# Patient Record
Sex: FEMALE | Race: WHITE | Employment: STUDENT | URBAN - METROPOLITAN AREA
[De-identification: names, ages, dates, MRNs, and addresses within clinical notes are randomized per-mention and may not be internally consistent; named-entity substitution may affect disease eponyms.]

---
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
## 2022-04-19 DIAGNOSIS — F41.9 ANXIETY: ICD-10-CM

## 2022-04-19 RX ORDER — FLUOXETINE 10 MG/1
CAPSULE ORAL
Qty: 90 CAPSULE | Refills: 3 | Status: SHIPPED | OUTPATIENT
Start: 2022-04-19 | End: 2022-08-17

## 2022-04-19 NOTE — TELEPHONE ENCOUNTER
"Requested Prescriptions   Signed Prescriptions Disp Refills    FLUoxetine (PROZAC) 10 MG capsule 90 capsule 3     Sig: TAKE 1 CAPSULE(10 MG) BY MOUTH DAILY       SSRIs Protocol Passed - 4/19/2022  8:02 AM        Passed - Recent (12 mo) or future (30 days) visit within the authorizing provider's specialty     Patient has had an office visit with the authorizing provider or a provider within the authorizing providers department within the previous 12 mos or has a future within next 30 days. See \"Patient Info\" tab in inbasket, or \"Choose Columns\" in Meds & Orders section of the refill encounter.              Passed - Medication is active on med list        Passed - Patient is age 18 or older        Passed - No active pregnancy on record        Passed - No positive pregnancy test in last 12 months           Sandrine Paez RN  [facility]     "

## 2022-07-10 ENCOUNTER — HEALTH MAINTENANCE LETTER (OUTPATIENT)
Age: 22
End: 2022-07-10

## 2022-08-17 ENCOUNTER — LAB (OUTPATIENT)
Dept: LAB | Facility: CLINIC | Age: 22
End: 2022-08-17
Payer: COMMERCIAL

## 2022-08-17 ENCOUNTER — OFFICE VISIT (OUTPATIENT)
Dept: FAMILY MEDICINE | Facility: CLINIC | Age: 22
End: 2022-08-17

## 2022-08-17 VITALS
BODY MASS INDEX: 44.39 KG/M2 | WEIGHT: 260 LBS | OXYGEN SATURATION: 98 % | HEART RATE: 70 BPM | SYSTOLIC BLOOD PRESSURE: 146 MMHG | DIASTOLIC BLOOD PRESSURE: 86 MMHG | HEIGHT: 64 IN | TEMPERATURE: 97.9 F

## 2022-08-17 DIAGNOSIS — Z00.01 ENCOUNTER FOR PREVENTATIVE ADULT HEALTH CARE EXAM WITH ABNORMAL FINDINGS: Primary | ICD-10-CM

## 2022-08-17 DIAGNOSIS — Z23 NEED FOR VACCINATION: ICD-10-CM

## 2022-08-17 DIAGNOSIS — R03.0 ELEVATED BLOOD PRESSURE READING WITHOUT DIAGNOSIS OF HYPERTENSION: ICD-10-CM

## 2022-08-17 DIAGNOSIS — Z11.3 SCREENING FOR STDS (SEXUALLY TRANSMITTED DISEASES): ICD-10-CM

## 2022-08-17 DIAGNOSIS — Z11.59 NEED FOR HEPATITIS C SCREENING TEST: ICD-10-CM

## 2022-08-17 DIAGNOSIS — Z11.4 SCREENING FOR HIV (HUMAN IMMUNODEFICIENCY VIRUS): ICD-10-CM

## 2022-08-17 DIAGNOSIS — F41.9 ANXIETY: ICD-10-CM

## 2022-08-17 DIAGNOSIS — Z00.01 ENCOUNTER FOR PREVENTATIVE ADULT HEALTH CARE EXAM WITH ABNORMAL FINDINGS: ICD-10-CM

## 2022-08-17 DIAGNOSIS — N94.6 DYSMENORRHEA: ICD-10-CM

## 2022-08-17 PROBLEM — G56.03 BILATERAL CARPAL TUNNEL SYNDROME: Status: RESOLVED | Noted: 2021-06-18 | Resolved: 2022-08-17

## 2022-08-17 PROBLEM — K80.20 GALLSTONES: Status: RESOLVED | Noted: 2019-08-21 | Resolved: 2022-08-17

## 2022-08-17 LAB
ALBUMIN SERPL-MCNC: 3.6 G/DL (ref 3.4–5)
ALP SERPL-CCNC: 68 U/L (ref 40–150)
ALT SERPL W P-5'-P-CCNC: 67 U/L (ref 0–50)
ANION GAP SERPL CALCULATED.3IONS-SCNC: 7 MMOL/L (ref 3–14)
AST SERPL W P-5'-P-CCNC: 202 U/L (ref 0–45)
BILIRUB SERPL-MCNC: 0.3 MG/DL (ref 0.2–1.3)
BUN SERPL-MCNC: 14 MG/DL (ref 7–30)
CALCIUM SERPL-MCNC: 9.2 MG/DL (ref 8.5–10.1)
CHLORIDE BLD-SCNC: 109 MMOL/L (ref 94–109)
CO2 SERPL-SCNC: 22 MMOL/L (ref 20–32)
CREAT SERPL-MCNC: 0.67 MG/DL (ref 0.52–1.04)
ERYTHROCYTE [DISTWIDTH] IN BLOOD BY AUTOMATED COUNT: 12.7 % (ref 10–15)
GFR SERPL CREATININE-BSD FRML MDRD: >90 ML/MIN/1.73M2
GLUCOSE BLD-MCNC: 86 MG/DL (ref 70–99)
HCT VFR BLD AUTO: 39.9 % (ref 35–47)
HGB BLD-MCNC: 13.3 G/DL (ref 11.7–15.7)
MCH RBC QN AUTO: 29 PG (ref 26.5–33)
MCHC RBC AUTO-ENTMCNC: 33.3 G/DL (ref 31.5–36.5)
MCV RBC AUTO: 87 FL (ref 78–100)
PLATELET # BLD AUTO: 293 10E3/UL (ref 150–450)
POTASSIUM BLD-SCNC: 4.3 MMOL/L (ref 3.4–5.3)
PROT SERPL-MCNC: 7.3 G/DL (ref 6.8–8.8)
RBC # BLD AUTO: 4.59 10E6/UL (ref 3.8–5.2)
SODIUM SERPL-SCNC: 138 MMOL/L (ref 133–144)
TSH SERPL DL<=0.005 MIU/L-ACNC: 3.08 MU/L (ref 0.4–4)
WBC # BLD AUTO: 8.3 10E3/UL (ref 4–11)

## 2022-08-17 PROCEDURE — 99213 OFFICE O/P EST LOW 20 MIN: CPT | Mod: 25 | Performed by: FAMILY MEDICINE

## 2022-08-17 PROCEDURE — 80053 COMPREHEN METABOLIC PANEL: CPT

## 2022-08-17 PROCEDURE — 87591 N.GONORRHOEAE DNA AMP PROB: CPT

## 2022-08-17 PROCEDURE — 90472 IMMUNIZATION ADMIN EACH ADD: CPT | Performed by: FAMILY MEDICINE

## 2022-08-17 PROCEDURE — 85027 COMPLETE CBC AUTOMATED: CPT

## 2022-08-17 PROCEDURE — 87491 CHLMYD TRACH DNA AMP PROBE: CPT

## 2022-08-17 PROCEDURE — 86803 HEPATITIS C AB TEST: CPT

## 2022-08-17 PROCEDURE — 87389 HIV-1 AG W/HIV-1&-2 AB AG IA: CPT

## 2022-08-17 PROCEDURE — 90651 9VHPV VACCINE 2/3 DOSE IM: CPT | Performed by: FAMILY MEDICINE

## 2022-08-17 PROCEDURE — 90471 IMMUNIZATION ADMIN: CPT | Performed by: FAMILY MEDICINE

## 2022-08-17 PROCEDURE — 84443 ASSAY THYROID STIM HORMONE: CPT

## 2022-08-17 PROCEDURE — 99395 PREV VISIT EST AGE 18-39: CPT | Mod: 25 | Performed by: FAMILY MEDICINE

## 2022-08-17 PROCEDURE — 90620 MENB-4C VACCINE IM: CPT | Performed by: FAMILY MEDICINE

## 2022-08-17 PROCEDURE — 96127 BRIEF EMOTIONAL/BEHAV ASSMT: CPT | Performed by: FAMILY MEDICINE

## 2022-08-17 PROCEDURE — 36415 COLL VENOUS BLD VENIPUNCTURE: CPT

## 2022-08-17 RX ORDER — FLUOXETINE 10 MG/1
CAPSULE ORAL
Qty: 90 CAPSULE | Refills: 3 | Status: SHIPPED | OUTPATIENT
Start: 2022-08-17 | End: 2022-09-07 | Stop reason: DRUGHIGH

## 2022-08-17 RX ORDER — CARBOXYMETHYLCELLULOSE/CITRIC 0.75 G
1 CAPSULE ORAL DAILY
Qty: 30 CAPSULE | Refills: 11 | Status: SHIPPED | OUTPATIENT
Start: 2022-08-17 | End: 2022-09-07

## 2022-08-17 ASSESSMENT — ENCOUNTER SYMPTOMS
MYALGIAS: 0
EYE PAIN: 0
SORE THROAT: 0
WEAKNESS: 0
FEVER: 0
ARTHRALGIAS: 0
DIARRHEA: 0
NERVOUS/ANXIOUS: 0
COUGH: 0
DIZZINESS: 0
JOINT SWELLING: 0
HEARTBURN: 0
HEMATOCHEZIA: 0
PARESTHESIAS: 0
CONSTIPATION: 0
PALPITATIONS: 0
NAUSEA: 0
BREAST MASS: 0
SHORTNESS OF BREATH: 0
HEMATURIA: 0
FREQUENCY: 0
ABDOMINAL PAIN: 0
CHILLS: 0
DYSURIA: 0
HEADACHES: 0

## 2022-08-17 ASSESSMENT — PAIN SCALES - GENERAL: PAINLEVEL: NO PAIN (0)

## 2022-08-17 ASSESSMENT — PATIENT HEALTH QUESTIONNAIRE - PHQ9
SUM OF ALL RESPONSES TO PHQ QUESTIONS 1-9: 3
SUM OF ALL RESPONSES TO PHQ QUESTIONS 1-9: 3
10. IF YOU CHECKED OFF ANY PROBLEMS, HOW DIFFICULT HAVE THESE PROBLEMS MADE IT FOR YOU TO DO YOUR WORK, TAKE CARE OF THINGS AT HOME, OR GET ALONG WITH OTHER PEOPLE: NOT DIFFICULT AT ALL

## 2022-08-17 NOTE — PATIENT INSTRUCTIONS
Contact in 6 weeks with report on Plenity    Will change if needed    Continue weights and treadmill 3-4 times weekly    Followup 3rd HPV shot, ~Dec '22    Call [phone] to schedule gynecology appointment    Use [employer] to send in twice daily blood pressure and pulse readings x3 days using your mother's blood pressure cuff.  Goal is less than 140/90

## 2022-08-17 NOTE — PROGRESS NOTES
SUBJECTIVE:   CC: Alisia Larson is an 22 year old woman who presents for preventive health visit.       Patient has been advised of split billing requirements and indicates understanding: Yes  Healthy Habits:     Getting at least 3 servings of Calcium per day:  Yes    Bi-annual eye exam:  NO    Dental care twice a year:  Yes    Sleep apnea or symptoms of sleep apnea:  None    Diet:  Regular (no restrictions)    Frequency of exercise:  2-3 days/week    Duration of exercise:  45-60 minutes    Taking medications regularly:  Yes    Medication side effects:  None    PHQ-2 Total Score: 0    Additional concerns today:  No  Answers for HPI/ROS submitted by the patient on 8/17/2022  If you checked off any problems, how difficult have these problems made it for you to do your work, take care of things at home, or get along with other people?: Not difficult at all  PHQ9 TOTAL SCORE: 3    Noted hen she has been getting her periods lately they have been hurting really bas, wondering if she should make an appointment with a gynescologist for that.     Concern -worsening menstrual cramps  Onset: Several months    Description:   Bad cramps have become worse    Intensity: moderate, severe    Progression of Symptoms:  worsening and intermittent    Accompanying Signs & Symptoms:  No increase in bleeding    Previous history of similar problem:   Cramps involved in bed    Precipitating factors:   Worsened by: None    Alleviating factors:  Improved by: None  Therapies Tried and outcome: Ibuprofen only partially helpful    Anxiety Follow-Up    How are you doing with your anxiety since your last visit? No change    Are you having other symptoms that might be associated with anxiety? No    Have you had a significant life event? No     Are you feeling depressed? No    Do you have any concerns with your use of alcohol or other drugs? No    Social History     Tobacco Use     Smoking status: Never Smoker     Smokeless tobacco: Never  Used   Vaping Use     Vaping Use: Never used   Substance Use Topics     Alcohol use: Yes     Drug use: No     No flowsheet data found.  PHQ 8/17/2022   PHQ-9 Total Score 3   Q9: Thoughts of better off dead/self-harm past 2 weeks Not at all       Today's PHQ-2 Score:   PHQ-2 ( 1999 [employer]) 8/17/2022   Q1: Little interest or pleasure in doing things 0   Q2: Feeling down, depressed or hopeless 0   PHQ-2 Score 0   Q1: Little interest or pleasure in doing things More than half the days   Q2: Feeling down, depressed or hopeless Several days   PHQ-2 Score 3       Abuse: Current or Past (Physical, Sexual or Emotional) - No  Do you feel safe in your environment? Yes        Social History     Tobacco Use     Smoking status: Never Smoker     Smokeless tobacco: Never Used   Substance Use Topics     Alcohol use: Yes     If you drink alcohol do you typically have >3 drinks per day or >7 drinks per week? No    Alcohol Use 8/17/2022   Prescreen: >3 drinks/day or >7 drinks/week? No   Prescreen: >3 drinks/day or >7 drinks/week? -   No flowsheet data found.    Reviewed orders with patient.  Reviewed health maintenance and updated orders accordingly - Yes  Lab work is in process  Labs reviewed in EPIC    Breast Cancer Screening:        History of abnormal Pap smear: NO - under age 21, PAP not appropriate for age     Reviewed and updated as needed this visit by clinical staff   Tobacco  Allergies   Problems  Med Hx  Surg Hx  Fam Hx  Soc Hx          Reviewed and updated as needed this visit by Provider      Problems                Review of Systems   Constitutional: Negative for chills and fever.   HENT: Negative for congestion, ear pain, hearing loss and sore throat.    Eyes: Negative for pain and visual disturbance.   Respiratory: Negative for cough and shortness of breath.    Cardiovascular: Negative for chest pain, palpitations and peripheral edema.   Gastrointestinal: Negative for abdominal pain, constipation, diarrhea,  "heartburn, hematochezia and nausea.   Breasts:  Negative for tenderness, breast mass and discharge.   Genitourinary: Negative for dysuria, frequency, genital sores, hematuria, pelvic pain, urgency, vaginal bleeding and vaginal discharge.   Musculoskeletal: Negative for arthralgias, joint swelling and myalgias.   Skin: Negative for rash.   Neurological: Negative for dizziness, weakness, headaches and paresthesias.   Psychiatric/Behavioral: Negative for mood changes. The patient is not nervous/anxious.       OBJECTIVE:   BP (!) 146/86 (BP Location: Left arm, Patient Position: Sitting, Cuff Size: Adult Regular)   Pulse 70   Temp 97.9  F (36.6  C) (Temporal)   Ht 1.626 m (5' 4\")   Wt 117.9 kg (260 lb)   SpO2 98%   Breastfeeding No   BMI 44.63 kg/m    Physical Exam  GENERAL: healthy, alert, no distress and over weight  EYES: Eyes grossly normal to inspection, PERRL and conjunctivae and sclerae normal  HENT: ear canals and TM's normal, nose and mouth without ulcers or lesions  NECK: no adenopathy, no asymmetry, masses, or scars and thyroid normal to palpation  RESP: lungs clear to auscultation - no rales, rhonchi or wheezes  CV: regular rate and rhythm, normal S1 S2, no S3 or S4, no murmur, click or rub, no peripheral edema and peripheral pulses strong  ABDOMEN: soft, nontender, no hepatosplenomegaly, no masses and bowel sounds normal  MS: no gross musculoskeletal defects noted, no edema  SKIN: no suspicious lesions or rashes  NEURO: Normal strength and tone, mentation intact and speech normal  PSYCH: mentation appears normal, affect normal/bright    Diagnostic Test Results:  Labs reviewed in Epic    ASSESSMENT/PLAN:       ICD-10-CM    1. Encounter for preventative adult health care exam with abnormal findings  Z00.01 Comprehensive metabolic panel (BMP + Alb, Alk Phos, ALT, AST, Total. Bili, TP)   2. Dysmenorrhea  N94.6 Ob/Gyn Referral   3. Anxiety  F41.9 CBC with platelets     FLUoxetine (PROZAC) 10 MG capsule " "  4. Elevated blood pressure reading without diagnosis of hypertension  R03.0    5. Need for vaccination  Z23 Human Papilloma Virus Vaccine (Gardasil 9) 3 Dose IM     MEN B, IM (10 - 25 YRS) - Bexsero   6. BMI 40.0-44.9, adult (H)  Z68.41 Carboxymeth-Cellulose-CitricAc (PLENITY) CAPS     TSH with free T4 reflex     COUNSELING:  Reviewed preventive health counseling, as reflected in patient instructions       Regular exercise       Healthy diet/nutrition       Vision screening       Safe sex practices/STD prevention    Estimated body mass index is 44.63 kg/m  as calculated from the following:    Height as of this encounter: 1.626 m (5' 4\").    Weight as of this encounter: 117.9 kg (260 lb).    She reports that she has never smoked. She has never used smokeless tobacco.    Patient Instructions   Contact in 6 weeks with report on Plenity    Will change if needed    Continue weights and treadmill 3-4 times weekly    Followup 3rd HPV shot, ~Dec '22    Call [phone] to schedule gynecology appointment    Use [employer] to send in twice daily blood pressure and pulse readings x3 days using your mother's blood pressure cuff.  Goal is less than 140/90    Gage Villegas MD  [facility]  "

## 2022-08-18 PROBLEM — R03.0 ELEVATED BLOOD PRESSURE READING WITHOUT DIAGNOSIS OF HYPERTENSION: Status: ACTIVE | Noted: 2022-08-18

## 2022-08-18 LAB
C TRACH DNA SPEC QL NAA+PROBE: NEGATIVE
HCV AB SERPL QL IA: NONREACTIVE
HIV 1+2 AB+HIV1 P24 AG SERPL QL IA: NONREACTIVE
N GONORRHOEA DNA SPEC QL NAA+PROBE: NEGATIVE

## 2022-08-29 DIAGNOSIS — R74.01 TRANSAMINITIS: Primary | ICD-10-CM

## 2022-08-29 NOTE — RESULT ENCOUNTER NOTE
Kenny Crump: Your recent results are normal,      except 2 tests indicate inflammation of the liver. As this may be the result of excess of simple carbs (bread rice pasta and potatoes, as well as sweets), please reduce your intake ~by about 50%, instead eating vegetables and fruit. Also no alcohol.      Schedule fasting lab only appointment in ~ 6 weeks to repeat these fasting labs.    Please contact if questions.      Gage Villegas MD

## 2022-09-04 ENCOUNTER — HEALTH MAINTENANCE LETTER (OUTPATIENT)
Age: 22
End: 2022-09-04

## 2022-09-07 ENCOUNTER — VIRTUAL VISIT (OUTPATIENT)
Dept: FAMILY MEDICINE | Facility: CLINIC | Age: 22
End: 2022-09-07
Payer: COMMERCIAL

## 2022-09-07 DIAGNOSIS — F42.9 OBSESSIVE-COMPULSIVE DISORDER, UNSPECIFIED TYPE: Primary | ICD-10-CM

## 2022-09-07 DIAGNOSIS — F41.9 ANXIETY: ICD-10-CM

## 2022-09-07 DIAGNOSIS — K76.0 FATTY LIVER: ICD-10-CM

## 2022-09-07 PROCEDURE — 99213 OFFICE O/P EST LOW 20 MIN: CPT | Mod: TEL | Performed by: FAMILY MEDICINE

## 2022-09-07 RX ORDER — CARBOXYMETHYLCELLULOSE/CITRIC 0.75 G
1 CAPSULE ORAL DAILY
Qty: 30 CAPSULE | Refills: 11 | Status: SHIPPED | OUTPATIENT
Start: 2022-09-07

## 2022-09-07 RX ORDER — HYDROXYZINE HYDROCHLORIDE 25 MG/1
25 TABLET, FILM COATED ORAL
Qty: 45 TABLET | Refills: 3 | Status: SHIPPED | OUTPATIENT
Start: 2022-09-07 | End: 2023-10-17

## 2022-09-07 NOTE — PROGRESS NOTES
"Alisia is a 22 year old who is being evaluated via a billable telephone visit.      What phone number would you like to be contacted at? [phone]  How would you like to obtain your AVS? MyChart    Assessment & Plan     Obsessive-compulsive disorder, unspecified type  At goal     Anxiety  At goal   - hydrOXYzine (ATARAX) 25 MG tablet; Take 1 tablet (25 mg) by mouth nightly as needed for anxiety  - FLUoxetine (PROZAC) 20 MG capsule; Take 1 capsule (20 mg) by mouth daily    Fatty liver  Reducing simple carbs    BMI 40.0-44.9, adult (H)  - Carboxymeth-Cellulose-CitricAc (PLENITY) CAPS; Take 1 capsule by mouth daily    Review of external notes as documented elsewhere in note  Ordering of each unique test  Prescription drug management  25 minutes spent on the date of the encounter doing chart review, history and exam, documentation and further activities per the note     BMI:   Estimated body mass index is 44.63 kg/m  as calculated from the following:    Height as of 8/17/22: 1.626 m (5' 4\").    Weight as of 8/17/22: 117.9 kg (260 lb).     Patient Instructions   Contact pharmacy about Plenity    Followup in 6 months with me for medication update.    Within 12 months, repeat AST/ALT      Return in about 6 months (around 3/7/2023) for Routine Visit.    Gage Villegas MD  [facility]    Subjective   Alisia is a 22 year old, presenting for the following health issues:  Recheck Medication      HPI     Would like to discuss increasing her medication, also would like to recheck her ASL level as her last result came back high and she is concerned about that.    Anxiety Follow-Up    How are you doing with your anxiety since your last visit? Improved    Are you having other symptoms that might be associated with anxiety? Yes:  Yes, OCD    Have you had a significant life event? OTHER: Out of town College at  [employer]     Are you feeling depressed? No    Do you have any concerns with your use of " alcohol or other drugs? No    Social History     Tobacco Use     Smoking status: Never Smoker     Smokeless tobacco: Never Used   Vaping Use     Vaping Use: Never used   Substance Use Topics     Alcohol use: Yes     Drug use: No     No flowsheet data found.  PHQ 8/17/2022   PHQ-9 Total Score 3   Q9: Thoughts of better off dead/self-harm past 2 weeks Not at all       How many days per week do you miss taking your medication? 0    Fatty liver  Onset/Duration: Several years  Description: Asymptomatic  Intensity: Not applicable  Progression of Symptoms: Working on healthy eating  Accompanying Signs & Symptoms:  Does it feel like food gets stuck or trouble swallowing: No  Nausea: No  Vomiting (bloody?): No  Abdominal Pain: No  Black-Tarry stools: No  Bloody stools: No  History:  Previous similar episodes: YES  Previous ulcers: No  Precipitating factors:   Caffeine use: Some  Alcohol use: No  NSAID/Aspirin use: No  Tobacco use: No  Worse with .Simple carbohydrates  Alleviating factors: Reducing them  Therapies tried and outcome:             Lifestyle changes: Yes, helping            Medications: Pharmacy has medication on order, has not tried yet Plenity.  Switched order to to pharmacy    Review of Systems   Constitutional, HEENT, cardiovascular, pulmonary, gi and gu systems are negative, except as otherwise noted.      Objective           Vitals:  No vitals were obtained today due to virtual visit.    Physical Exam   healthy, alert and no distress  PSYCH: Alert and oriented times 3; coherent speech, normal   rate and volume, able to articulate logical thoughts, able   to abstract reason, no tangential thoughts, no hallucinations   or delusions  Her affect is normal  RESP: No cough, no audible wheezing, able to talk in full sentences  Remainder of exam unable to be completed due to telephone visits        Phone call duration: 25 minutes

## 2022-09-07 NOTE — PATIENT INSTRUCTIONS
Contact pharmacy about Plenity    Followup in 6 months with me for medication update.    Within 12 months, repeat AST/ALT

## 2023-07-18 ENCOUNTER — PATIENT OUTREACH (OUTPATIENT)
Dept: CARE COORDINATION | Facility: CLINIC | Age: 23
End: 2023-07-18
Payer: COMMERCIAL

## 2023-08-01 ENCOUNTER — PATIENT OUTREACH (OUTPATIENT)
Dept: CARE COORDINATION | Facility: CLINIC | Age: 23
End: 2023-08-01
Payer: COMMERCIAL

## 2023-10-01 ENCOUNTER — HEALTH MAINTENANCE LETTER (OUTPATIENT)
Age: 23
End: 2023-10-01

## 2023-10-16 DIAGNOSIS — F41.9 ANXIETY: ICD-10-CM

## 2023-10-17 RX ORDER — HYDROXYZINE HYDROCHLORIDE 25 MG/1
TABLET, FILM COATED ORAL
Qty: 60 TABLET | Refills: 0 | Status: SHIPPED | OUTPATIENT
Start: 2023-10-17 | End: 2024-05-24

## 2024-03-25 DIAGNOSIS — F41.9 ANXIETY: ICD-10-CM

## 2024-03-25 NOTE — TELEPHONE ENCOUNTER
Pending Prescriptions:                       Disp   Refills    FLUoxetine (PROZAC) 20 MG capsule         30 cap*0            Sig: Take 1 capsule (20 mg) by mouth daily Overdue for           annual appointment please call [phone] to           schedule and get full refills

## 2024-04-09 ENCOUNTER — TELEPHONE (OUTPATIENT)
Dept: FAMILY MEDICINE | Facility: CLINIC | Age: 24
End: 2024-04-09
Payer: COMMERCIAL

## 2024-04-09 DIAGNOSIS — L72.0 WEN: Primary | ICD-10-CM

## 2024-04-09 NOTE — TELEPHONE ENCOUNTER
Reason for Call:  Other call back    Detailed comments: pt has what she believes is a sebaceous cyst on her head. She is wondering what Dermatologist Dr. Villegas would recommend. Please contact pt.    Phone Number Patient can be reached at: Home number on file [phone] (home)    Best Time: any    Can we leave a detailed message on this number? YES    Call taken on 4/9/2024 at 9:46 AM by Sarahi Velasco

## 2024-04-09 NOTE — PROGRESS NOTES
Recommend derm at [place]- who depends on availability. Order signed, please notify, thanks Gage

## 2024-04-29 DIAGNOSIS — F41.9 ANXIETY: ICD-10-CM

## 2024-05-01 NOTE — TELEPHONE ENCOUNTER
Prescription filled for 30 day supply. Please notify- patient needs to schedule appointment for medication check.  Order signed, please notify, thanks Gage

## 2024-05-24 ENCOUNTER — VIRTUAL VISIT (OUTPATIENT)
Dept: FAMILY MEDICINE | Facility: CLINIC | Age: 24
End: 2024-05-24
Payer: COMMERCIAL

## 2024-05-24 DIAGNOSIS — F41.9 ANXIETY: ICD-10-CM

## 2024-05-24 DIAGNOSIS — F42.9 OBSESSIVE-COMPULSIVE DISORDER, UNSPECIFIED TYPE: Primary | ICD-10-CM

## 2024-05-24 PROCEDURE — 99442 PR PHYSICIAN TELEPHONE EVALUATION 11-20 MIN: CPT | Mod: 93 | Performed by: FAMILY MEDICINE

## 2024-05-24 RX ORDER — HYDROXYZINE HYDROCHLORIDE 25 MG/1
25 TABLET, FILM COATED ORAL DAILY PRN
Qty: 90 TABLET | Refills: 3 | Status: SHIPPED | OUTPATIENT
Start: 2024-05-24

## 2024-05-24 NOTE — PROGRESS NOTES
Alisia is a 24 year old who is being evaluated via a billable telephone visit.    What phone number would you like to be contacted at? cell  How would you like to obtain your AVS? [facility]  Originating Location (pt. Location): Home    Distant Location (provider location):  On-site    Assessment & Plan     Obsessive-compulsive disorder, unspecified type  At goal   - FLUoxetine (PROZAC) 20 MG capsule; Take 1 capsule (20 mg) by mouth daily    Anxiety  At goal   - hydrOXYzine HCl (ATARAX) 25 MG tablet; Take 1 tablet (25 mg) by mouth daily as needed for anxiety        Patient Instructions   [facility] blood pressures/pulses in morning before any medication, then at least 2 hrs or more later x 3 days     Appointment after return for blood pressure, fasting LFTs    Contact pharmacist regarding supply of medication for travel this summer through September.    See [employer] for a letter regarding your prescriptions    Subjective   Alisia is a 24 year old, presenting for the following health issues:  No chief complaint on file.    HPI -plans to study abroad for approximately 3 months in [place] this summer.  Needs to  3 to 4-month supply of medications at pharmacy.  Has lost some weight due to good food choices and lots of physical activity.  A cyst on her scalp went away and she canceled a Derm appointment.  She is staying with her folks since graduating college at [employer] and thinks that her mom may have a blood pressure cuff that she can send via [place] several blood pressures due to her whitecoat hypertension reading during her last visit.    Discussed briefly fatty liver and the need to repeat liver enzymes this fall.  Continue to increase physical activity and continued good food choices should help.    Anxiety   How are you doing with your anxiety since your last visit? Improved   Are you having other symptoms that might be associated with anxiety? No  Have you had a significant life event? No   Are you feeling  depressed? No  Do you have any concerns with your use of alcohol or other drugs? No    Social History     Tobacco Use    Smoking status: Never    Smokeless tobacco: Never   Vaping Use    Vaping status: Never Used   Substance Use Topics    Alcohol use: Yes    Drug use: No          No data to display                  8/17/2022     9:09 AM   PHQ   PHQ-9 Total Score 3   Q9: Thoughts of better off dead/self-harm past 2 weeks Not at all     How many days per week do you miss taking your medication? 0        Review of Systems  Constitutional, HEENT, cardiovascular, pulmonary, gi and gu systems are negative, except as otherwise noted.      Objective           Vitals:  No vitals were obtained today due to virtual visit.    Physical Exam   General: Alert and no distress //Respiratory: No audible wheeze, cough, or shortness of breath // Psychiatric:  Appropriate affect, tone, and pace of words          Phone call duration: 15 minutes  Signed Electronically by: Gage Villegas MD

## 2024-05-24 NOTE — LETTER
To whom it may concern:    Re: Melody Larson    I serve as Ms. Galveztierra's primary care physician.  This letter serves as noticed that I have     prescribed the following medication to Ms. backfilled for medical reasons:    -Fluoxetine 20 mg daily  -Hydroxyzine 25 mg daily as needed    Please contact me if any questions.    Yours truly,          Gage Villegas MD

## 2024-05-24 NOTE — PATIENT INSTRUCTIONS
MyChart blood pressures/pulses in morning before any medication, then at least 2 hrs or more later x 3 days     Appointment after return for blood pressure, fasting LFTs    Contact pharmacist regarding supply of medication for travel this summer through September.    See MyChart for a letter regarding your prescriptions

## 2024-07-03 ENCOUNTER — MYC MEDICAL ADVICE (OUTPATIENT)
Dept: FAMILY MEDICINE | Facility: CLINIC | Age: 24
End: 2024-07-03
Payer: COMMERCIAL

## 2024-07-03 DIAGNOSIS — F42.9 OBSESSIVE-COMPULSIVE DISORDER, UNSPECIFIED TYPE: ICD-10-CM

## 2024-11-17 ENCOUNTER — MYC MEDICAL ADVICE (OUTPATIENT)
Dept: FAMILY MEDICINE | Facility: CLINIC | Age: 24
End: 2024-11-17
Payer: COMMERCIAL

## 2024-11-24 ENCOUNTER — HEALTH MAINTENANCE LETTER (OUTPATIENT)
Age: 24
End: 2024-11-24

## 2025-01-29 ENCOUNTER — MYC REFILL (OUTPATIENT)
Dept: FAMILY MEDICINE | Facility: CLINIC | Age: 25
End: 2025-01-29
Payer: COMMERCIAL

## 2025-01-29 DIAGNOSIS — F42.9 OBSESSIVE-COMPULSIVE DISORDER, UNSPECIFIED TYPE: ICD-10-CM

## 2025-01-29 DIAGNOSIS — F41.9 ANXIETY: ICD-10-CM

## 2025-01-29 RX ORDER — HYDROXYZINE HYDROCHLORIDE 25 MG/1
25 TABLET, FILM COATED ORAL DAILY PRN
Qty: 90 TABLET | Refills: 3 | OUTPATIENT
Start: 2025-01-29

## 2025-01-29 NOTE — TELEPHONE ENCOUNTER
Clinic RN: Please investigate patient's chart or contact patient if the information cannot be found because patient should have run out of this medication on 2024. Confirm patient is taking this medication as prescribed. Document findings and route refill encounter to provider for approval or denial.

## 2025-03-22 ENCOUNTER — E-VISIT (OUTPATIENT)
Dept: FAMILY MEDICINE | Facility: CLINIC | Age: 25
End: 2025-03-22
Payer: COMMERCIAL

## 2025-03-22 DIAGNOSIS — J02.9 SORE THROAT: Primary | ICD-10-CM
